# Patient Record
(demographics unavailable — no encounter records)

---

## 2025-06-09 NOTE — HISTORY OF PRESENT ILLNESS
[de-identified] : 06/09/2025:  AJITH DEL RIO is a 60 year y/o F here for evaluation of their left knee. she notes being in severe pain. she notes that she has been taking Tylenol every day for the last week. has had treatment for her right knee previously. pt notes having disk disease in her back. denies injury. pain is constant increases with stairs. pt notes pain radiates down her leg.   not working  NKDA  diabetes type 2 A1c under 7 is controlled, Cohns disease, arteritis, disk disease.

## 2025-06-09 NOTE — PROCEDURE
[FreeTextEntry3] : The risks, benefits and alternatives to the injection were discussed with the patient. The decision was made to proceed with the injection to reduce inflammation within the area. Verbal consent was obtained for the procedure. The left knee was cleaned with alcohol and ethyl chloride was sprayed topically. 1cc of 40mg Kenalog and 4cc of 1% lidocaine was injected. The patient tolerated the procedure well and was instructed to ice the affected joint as need+-ed later today. Activities can be performed as tolerated  `

## 2025-06-09 NOTE — ASSESSMENT
[FreeTextEntry1] : 60 year F with bilateral knee OA. Prior right knee OA with CSI 3 years prior.  Left CSI today PRN fu

## 2025-06-09 NOTE — IMAGING
[de-identified] : LEFT KNEE EXAM Alignment: Neutral  Effusion: None Atrophy: None                                                  Stable to Varus/valgus stress Posterior Drawer Test: negative Anterior Drawer Test: Negative Knee Extension/Flexion: 0 / 120  Medial/lateral compartments Medial joint line: POS Tenderness Lateral joint line: No Tenderness Maynor test: POS  Patellofemoral joint Medial patellar facet: no tenderness Patellar grind: Negative  Tendons: Pes Anserine: No tenderness Gerdys Tubercle/ IT Band: No tenderness Quadriceps Tendon: No Tenderness patellar tendon: no Tenderness Tibial tubercle: not tenderness Calf: no Tenderness  Neurovascular exam Muscle strength: 5/5 Sensation to light touch: intact Distal pulses: 2+  [id number] Xrays of the Left Knee were taken demonstrating mild medial compartment OA and PF, w/o significant change from prior XR